# Patient Record
Sex: MALE | Race: WHITE
[De-identification: names, ages, dates, MRNs, and addresses within clinical notes are randomized per-mention and may not be internally consistent; named-entity substitution may affect disease eponyms.]

---
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
## 2019-02-17 ENCOUNTER — HOSPITAL ENCOUNTER (EMERGENCY)
Dept: HOSPITAL 62 - ER | Age: 19
LOS: 1 days | Discharge: HOME | End: 2019-02-18
Payer: COMMERCIAL

## 2019-02-17 DIAGNOSIS — R45.1: ICD-10-CM

## 2019-02-17 DIAGNOSIS — F10.920: ICD-10-CM
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
## 2019-02-17 DIAGNOSIS — F41.9: ICD-10-CM

## 2019-02-17 DIAGNOSIS — S51.811A: Primary | ICD-10-CM

## 2019-02-17 LAB
ADD MANUAL DIFF: NO
ALBUMIN SERPL-MCNC: 4.3 G/DL (ref 3.7–5.6)
ALP SERPL-CCNC: 49 U/L (ref 65–260)
ALT SERPL-CCNC: 19 U/L (ref 10–40)
ANION GAP SERPL CALC-SCNC: 10 MMOL/L (ref 5–19)
APAP SERPL-MCNC: < 10 UG/ML (ref 10–30)
APPEARANCE UR: CLEAR
APTT PPP: (no result) S
AST SERPL-CCNC: 18 U/L (ref 10–45)
BARBITURATES UR QL SCN: NEGATIVE
BASOPHILS # BLD AUTO: 0.1 10^3/UL (ref 0–0.2)
BASOPHILS NFR BLD AUTO: 0.7 % (ref 0–2)
BILIRUB DIRECT SERPL-MCNC: 0.1 MG/DL (ref 0–0.4)
BILIRUB SERPL-MCNC: 0.4 MG/DL (ref 0.2–1.3)
BILIRUB UR QL STRIP: NEGATIVE
BUN SERPL-MCNC: 8 MG/DL (ref 7–20)
CALCIUM: 9.4 MG/DL (ref 8.4–10.2)
CHLORIDE SERPL-SCNC: 107 MMOL/L (ref 98–107)
CO2 SERPL-SCNC: 27 MMOL/L (ref 22–30)
EOSINOPHIL # BLD AUTO: 0.7 10^3/UL (ref 0–0.6)
EOSINOPHIL NFR BLD AUTO: 8 % (ref 0–6)
ERYTHROCYTE [DISTWIDTH] IN BLOOD BY AUTOMATED COUNT: 13.8 % (ref 11.5–14)
ETHANOL SERPL-MCNC: 49 MG/DL
GLUCOSE SERPL-MCNC: 88 MG/DL (ref 75–110)
GLUCOSE UR STRIP-MCNC: NEGATIVE MG/DL
HCT VFR BLD CALC: 46.2 % (ref 37.9–51)
HGB BLD-MCNC: 16.2 G/DL (ref 13.5–17)
KETONES UR STRIP-MCNC: NEGATIVE MG/DL
LYMPHOCYTES # BLD AUTO: 1.6 10^3/UL (ref 0.5–4.7)
LYMPHOCYTES NFR BLD AUTO: 19.1 % (ref 13–45)
MCH RBC QN AUTO: 31.6 PG (ref 27–33.4)
MCHC RBC AUTO-ENTMCNC: 35.2 G/DL (ref 32–36)
MCV RBC AUTO: 90 FL (ref 80–97)
METHADONE UR QL SCN: NEGATIVE
MONOCYTES # BLD AUTO: 0.8 10^3/UL (ref 0.1–1.4)
MONOCYTES NFR BLD AUTO: 9 % (ref 3–13)
NEUTROPHILS # BLD AUTO: 5.4 10^3/UL (ref 1.7–8.2)
NEUTS SEG NFR BLD AUTO: 63.2 % (ref 42–78)
NITRITE UR QL STRIP: NEGATIVE
PCP UR QL SCN: NEGATIVE
PH UR STRIP: 7 [PH] (ref 5–9)
PLATELET # BLD: 198 10^3/UL (ref 150–450)
POTASSIUM SERPL-SCNC: 4.2 MMOL/L (ref 3.6–5)
PROT SERPL-MCNC: 6.5 G/DL (ref 6.3–8.2)
PROT UR STRIP-MCNC: NEGATIVE MG/DL
RBC # BLD AUTO: 5.14 10^6/UL (ref 4.35–5.55)
SALICYLATES SERPL-MCNC: < 1 MG/DL (ref 2–20)
SODIUM SERPL-SCNC: 143.7 MMOL/L (ref 137–145)
SP GR UR STRIP: 1.01
TOTAL CELLS COUNTED % (AUTO): 100 %
URINE AMPHETAMINES SCREEN: NEGATIVE
URINE BENZODIAZEPINES SCREEN: NEGATIVE
URINE COCAINE SCREEN: NEGATIVE
URINE MARIJUANA (THC) SCREEN: (no result)
UROBILINOGEN UR-MCNC: NEGATIVE MG/DL (ref ?–2)
WBC # BLD AUTO: 8.5 10^3/UL (ref 4–10.5)

## 2019-02-17 PROCEDURE — 96372 THER/PROPH/DIAG INJ SC/IM: CPT

## 2019-02-17 PROCEDURE — 80307 DRUG TEST PRSMV CHEM ANLYZR: CPT

## 2019-02-17 PROCEDURE — 90471 IMMUNIZATION ADMIN: CPT

## 2019-02-17 PROCEDURE — 90715 TDAP VACCINE 7 YRS/> IM: CPT

## 2019-02-17 PROCEDURE — 93005 ELECTROCARDIOGRAM TRACING: CPT

## 2019-02-17 PROCEDURE — 85025 COMPLETE CBC W/AUTO DIFF WBC: CPT

## 2019-02-17 PROCEDURE — 12002 RPR S/N/AX/GEN/TRNK2.6-7.5CM: CPT

## 2019-02-17 PROCEDURE — 80053 COMPREHEN METABOLIC PANEL: CPT

## 2019-02-17 PROCEDURE — 36415 COLL VENOUS BLD VENIPUNCTURE: CPT

## 2019-02-17 PROCEDURE — 0HQDXZZ REPAIR RIGHT LOWER ARM SKIN, EXTERNAL APPROACH: ICD-10-PCS | Performed by: EMERGENCY MEDICINE

## 2019-02-17 PROCEDURE — 99285 EMERGENCY DEPT VISIT HI MDM: CPT

## 2019-02-17 PROCEDURE — 93010 ELECTROCARDIOGRAM REPORT: CPT

## 2019-02-17 PROCEDURE — 81001 URINALYSIS AUTO W/SCOPE: CPT
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
## 2019-02-17 NOTE — ER DOCUMENT REPORT
Addendum entered and electronically signed by NENITA BROWN MD  02/18/19 16:33:










Discharge





- Discharge


Clinical Impression: 


 Suicide attempt





Laceration of right forearm


Qualifiers:


 Encounter type: initial encounter Qualified Code(s): S51.811A - Laceration 

without foreign body of right forearm, initial encounter





Alcohol intoxication


Qualifiers:


 Complication of substance-induced condition: uncomplicated Qualified Code(s): 

F10.920 - Alcohol use, unspecified with intoxication, uncomplicated





Condition: Stable


Disposition: HOME, SELF-CARE


Additional Instructions: 


You have been evaluated and assessed at [facility] Emergency Department by both the 

medical and behavioral health teams after presenting for   suicidal ideation and

self-injurious behavior (cutting) and are now deemed appropriate for discharge. 

While in the ED, you received an initial medical screening, lab work, EKG, 

medications, direct staff observation, clinical evaluation, physician 

assessment, and outpatient resources.  You were cleared from both services.  

Mobile crisis resources were provided to you for when these situations arise and

you are encouraged to utilize services as discussed.  Homeless resources were 

provided to you to include shelter, food [facility], [facility], DSS Medicaid and 

vocational rehab contact information.  It is felt that you would benefit and be 

better able to maintain your mental health with therapy and you were provided 

with a list of mental health providers.  





DEPRESSION:


     Your evaluation reveals that you have mental depression. While symptoms may

be vague, they often include disturbance of sleep, fatigue, loss of appetite, 

and general loss of interest in life.  While depression may be a side effect of 

drugs, or a reaction to a major change in your life, many cases have no known 

cause.


     If depression is acute, and related to a major loss in your life, you can 

expect it to clear completely with time.  If you have been depressed a long 

time, are prone to repeated bouts of depression or low mood, or have been 

thinking of suicide, get help.


     Depression can be treated with anti-depressant medication and counselling. 

Long-term depression will often take a few weeks to clear, even with appropriate

medication.  Follow-up care is important.








SUICIDAL IDEATION:


     Suicidal ideation is a common medical term for thoughts about suicide, 

which may be as detailed as a formulated plan, without the suicidal act itself. 

Although most people who undergo suicidal ideation do not commit suicide, some 

go on to make suicide attempts. The range of suicidal ideation varies greatly 

from fleeting to detailed planning, role playing, and unsuccessful attempts.





     While thoughts about suicide are common, most people do not carry out 

serious actions to commit suicide.  Based upon your evaluation and discussion 

with you, we do not believe you are currently at risk to act upon your thoughts 

of suicide.  You have agreed to return to the Emergency Department, at any time,

if you feel inclined to act upon your suicidal thoughts.








FOLLOW-UP CARE:


If you have been referred to a physician for follow-up care, call the 

physicians office for an appointment as you were instructed or within the next 

two days.  If you experience worsening or a significant change in your symptoms,

notify the physician immediately or return to the Emergency Department at any 

time for re-evaluation.





Referrals: 


[facility] [Provider Group] - Follow up as needed





Addendum entered and electronically signed by JONELLE HALL LPCA  02/18/19 

16:31: 








Discharge





- Discharge


Clinical Impression: 


 Suicide attempt





Laceration of right forearm


Qualifiers:


 Encounter type: initial encounter Qualified Code(s): S51.811A - Laceration 

without foreign body of right forearm, initial encounter





Alcohol intoxication


Qualifiers:


 Complication of substance-induced condition: uncomplicated Qualified Code(s): 

F10.920 - Alcohol use, unspecified with intoxication, uncomplicated





Condition: Stable


Disposition: HOME, SELF-CARE


Additional Instructions: 


You have been evaluated and assessed at [facility] Emergency Department by both the 

medical and behavioral health teams after presenting for   suicidal ideation and

self-injurious behavior (cutting) and are now deemed appropriate for discharge. 

While in the ED, you received an initial medical screening, lab work, EKG, 

medications, direct staff observation, clinical evaluation, physician 

assessment, and outpatient resources.  You were cleared from both services.  

Mobile crisis resources were provided to you for when these situations arise and

you are encouraged to utilize services as discussed.  Homeless resources were 

provided to you to include shelter, food [facility], [facility], DSS Medicaid and 

vocational rehab contact information.  It is felt that you would benefit and be 

better able to maintain your mental health with therapy and you were provided 

with a list of mental health providers.  





DEPRESSION:


     Your evaluation reveals that you have mental depression. While symptoms may

be vague, they often include disturbance of sleep, fatigue, loss of appetite, 

and general loss of interest in life.  While depression may be a side effect of 

drugs, or a reaction to a major change in your life, many cases have no known 

cause.


     If depression is acute, and related to a major loss in your life, you can 

expect it to clear completely with time.  If you have been depressed a long 

time, are prone to repeated bouts of depression or low mood, or have been th

inking of suicide, get help.


     Depression can be treated with anti-depressant medication and counselling. 

Long-term depression will often take a few weeks to clear, even with appropriate

medication.  Follow-up care is important.








SUICIDAL IDEATION:


     Suicidal ideation is a common medical term for thoughts about suicide, 

which may be as detailed as a formulated plan, without the suicidal act itself. 

Although most people who undergo suicidal ideation do not commit suicide, some 

go on to make suicide attempts. The range of suicidal ideation varies greatly 

from fleeting to detailed planning, role playing, and unsuccessful attempts.





     While thoughts about suicide are common, most people do not carry out 

serious actions to commit suicide.  Based upon your evaluation and discussion wi

th you, we do not believe you are currently at risk to act upon your thoughts of

suicide.  You have agreed to return to the Emergency Department, at any time, if

you feel inclined to act upon your suicidal thoughts.








FOLLOW-UP CARE:


If you have been referred to a physician for follow-up care, call the 

physicians office for an appointment as you were instructed or within the next 

two days.  If you experience worsening or a significant change in your symptoms,

notify the physician immediately or return to the Emergency Department at any 

time for re-evaluation.





Referrals: 


[facility] [Provider Group] - Follow up as needed





Original Note:








ED General





- General


Stated Complaint: SUICIDAL IDEATION


Time Seen by Provider: 02/17/19 15:01


TRAVEL OUTSIDE OF THE [place] IN LAST 30 DAYS: No





- HPI


Notes: 





Patient is a 18-year-old male that presents to the emergency department for 

chief complaint of self-inflicted wound.


Patient has a laceration to his right forearm that he states he inflicted on 

himself with a pocket knife.  He is refusing to tell me any more details.  He is

very anxious and agitated.  Patient is stating that if he does not talk to 

Mal[place] he will not talk to anybody.  Patient was brought in by police after an 

unknown female contacted them stating that he was sending her pictures of a 

self-inflicted wound.  Police are unsure if he texted her any specific suicidal 

plans.  Patient does have a history of psychiatric illness and IVC in the past. 

Police did see the text message between the patient and female which showed his 

arm laceration.  HPI is limited because of patient's unwillingness to 

communicate with myself and staff currently.





Past Medical History: PTSD, bipolar





Past Surgical History: Unknown





Social History: Unknown





Family History: Reviewed and noncontributory for presenting illness





Allergies: Reviewed, see documented allergy list.








REVIEW OF SYSTEMS:


Unable to obtain because of patient's unwillingness to communicate








PHYSICAL EXAMINATION:





Vital signs reviewed, nursing noted reviewed.





GENERAL: Well-appearing, well-nourished and in no acute distress.





HEAD: Atraumatic, normocephalic.





EYES: Eyes appear normal, extraocular movements intact, sclera anicteric, 

conjunctiva are normal.





ENT: nares patent, oropharynx clear without exudates.  Moist mucous membranes.





NECK: Normal range of motion, supple without lymphadenopathy





LUNGS: Breath sounds clear to auscultation bilaterally and equal.  No wheezes 

rales or rhonchi.





HEART: Regular rate and rhythm without murmurs





ABDOMEN: Soft, nontender, normoactive bowel sounds.  No rebound, guarding, or 

rigidity. No masses appreciated.





EXTREMITIES: Nontender, good range of motion, no pitting or edema. 





NEUROLOGICAL: No focal neurological deficits. Moves all extremities spont

aneously Motor and sensory grossly intact on exam.





PSYCH: Agitated, anxious, no eye contact





SKIN: Warm, Dry, normal turgor, linear 5.5 cm full-thickness right forearm 

laceration with no active bleeding





- Related Data


Allergies/Adverse Reactions: 


                                        





red dye Allergy (Verified 02/14/18 13:58)


   


methylphenidate [From Concerta] Adverse Reaction (Verified 02/14/18 13:58)


   











Past Medical History





- Social History


Smoking Status: Unknown if Ever Smoked


Family History: Arthritis, CAD, COPD, CVA, DM, Hyperlipidemia, Hypertension, 

Malignancy


Pulmonary Medical History: Reports: Hx Asthma


Renal/ Medical History: Denies: Hx Peritoneal Dialysis


Psychiatric Medical History: Reports: Hx Bipolar Disorder, Hx Post Traumatic 

Stress Disorder, Hx Schizophrenia - paranoia





- Immunizations


Immunizations up to date: Yes





Physical Exam





- Vital signs


Vitals: 


                                        











Temp Pulse Resp BP Pulse Ox


 


 98.4 F   72   14   119/56 L  99 


 


 02/17/19 15:15  02/17/19 15:15  02/17/19 15:15  02/17/19 15:15  02/17/19 15:15














Course





- Re-evaluation


Re-evalutation: 





02/17/19 15:25


Vitals reviewed.  Nursing notes reviewed.  Patient is agitated and not complying

with HPI or exam.  He has a deep laceration to his right forearm.  Bleeding is 

controlled at presentation.  Patient was ordered Geodon for his agitated state 

and aggression towards myself and staff.  Patient placed on IVC for self-

inflicted wound and concern for suicide attempt.


02/17/19 17:35


Patient reevaluated after Geodon and has been calm and cooperative.  There is 

now a girlfriend at bedside who states that she found a suicide note written by 

the patient.  His blood work is unremarkable.  He does have a slight elevation 

of his alcohol.  His right forearm laceration will be cleaned and sutured.  

Patient awaiting psych evaluation in the morning.





- Vital Signs


Vital signs: 


                                        











Temp Pulse Resp BP Pulse Ox


 


 98.3 F   62   14   112/62   99 


 


 02/17/19 18:23  02/17/19 18:23  02/17/19 15:15  02/17/19 18:23  02/17/19 18:23














- Laboratory


Result Diagrams: 


                                 02/17/19 15:29





                                 02/17/19 15:29


Laboratory results interpreted by me: 


                                        











  02/17/19 02/17/19





  15:29 15:29


 


Eosinophils %  8.0 H 


 


Absolute Eosinophils  0.7 H 


 


Alkaline Phosphatase   49 L


 


Salicylates   < 1.0 L


 


Acetaminophen   < 10 L














- EKG Interpretation by Me


Additional EKG results interpreted by me: 





02/17/19 15:25


Interpreted by myself


1519: Normal sinus rhythm, rate 71, normal axis, no ectopy, no STEMI, no 

significant change from 10/20/17





Procedures





- Laceration/Wound Repair


  ** Right Arm


Time completed: 19:38


Wound length (cm): 5.5


Wound's Depth, Shape: Linear, Other - full thickness.  No: Into muscle


Laceration pre-procedure: Sterile PPE donned


Anesthetic type: 1% Lidocaine w/epi


Volume Anesthetic (mLs): 5


Wound explored: Clean, No foreign body removed


Wound Debrided: Moderate


Wound Repaired With: Sutures


Suture Size/Type: 4:0, Nylon


Number of Sutures: 5


Layer Closure?: No


Post-procedure wound care: Sterile dressing applied


Post-procedure NV exam normal: Yes


Complications: No





Discharge





- Discharge


Clinical Impression: 


 Suicide attempt





Laceration of right forearm


Qualifiers:


 Encounter type: initial encounter Qualified Code(s): S51.811A - Laceration 

without foreign body of right forearm, initial encounter





Alcohol intoxication


Qualifiers:


 Complication of substance-induced condition: uncomplicated Qualified Code(s): 

F10.920 - Alcohol use, unspecified with intoxication, uncomplicated





Condition: Stable

## 2019-02-18 VITALS — SYSTOLIC BLOOD PRESSURE: 118 MMHG | DIASTOLIC BLOOD PRESSURE: 61 MMHG
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
## 2019-03-25 ENCOUNTER — HOSPITAL ENCOUNTER (EMERGENCY)
Dept: HOSPITAL 62 - ER | Age: 19
LOS: 1 days | Discharge: HOME | End: 2019-03-26
Payer: MEDICAID

## 2019-03-25 DIAGNOSIS — R45.1: Primary | ICD-10-CM

## 2019-03-25 PROCEDURE — 93005 ELECTROCARDIOGRAM TRACING: CPT

## 2019-03-25 PROCEDURE — 99285 EMERGENCY DEPT VISIT HI MDM: CPT

## 2019-03-25 PROCEDURE — 93010 ELECTROCARDIOGRAM REPORT: CPT

## 2019-03-25 NOTE — EKG REPORT
SEVERITY:- BORDERLINE ECG -

SINUS ARRHYTHMIA, RATE [id number]

BORDERLINE PROLONGED QT INTERVAL

:

Confirmed by: Veena Gupta MD 25-Mar-2019 21:27:23

## 2019-03-25 NOTE — ER DOCUMENT REPORT
ED General





- General


Stated Complaint: PSYCH/IVC


Time Seen by Provider: 03/25/19 22:06


Notes: 





Patient is a 19-year-old male who was sent on vomiting, paperwork because of 

concerns for possible suicidal ideations.  Patient says that he can augment his 

girlfriend.  He became upset.  He says he was not at all suicidal.  He says his 

roommate was drunk and started pestering him and calling his girlfriend a 

"bitch".  Patient said he then became very upset and started yelling at his 

roommate and asked him not to call her bridge.  He then got a further argument 

with his roommate and then left the house.  Patient says he has a job tomorrow 

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  He said he want to get his drill 

get his stuff so that he had it in case he had to leave the house in this way 

still had his equipment for when he went to work tomorrow.  He also had a 

utility knife at that time.  Patient says that his roommate saw the knife and 

said that he was worried that he could be suicidal.  Patient is not suicidal.  

Patient said he therefore became upset and threw the knife outside.  Patient 

admits that he then yelled at his roommate "you would drive some crazy to the 

point that they would be suicidal".  Patient says he did not mean in any way 

with this, that he himself was suicidal.  Patient did not do anything to hurt 

himself.  The roommate then called mobile crisis and mobile crisis came and 

evaluated the patient.  Patient was eventually placed on IVC papers and brought 

to the ER.  Patient currently denies any suicidal thoughts.  He did try 2 weeks 

ago after an argument with his girlfriend and was drunk and upset.  He says it 

was a stupid thing that he did and he was drunk at that time and said that he 

was not truly suicidal.  Patient says that he is not at all suicidal now and 

says that he really wants to go home by morning time because he wants to meet up

with his new boss at 5 AM so he can start his new job.  He says if he misses 

meeting his boss and he will probably lose this job which he really needs.


TRAVEL OUTSIDE OF THE [place] IN LAST 30 DAYS: No





- Related Data


Allergies/Adverse Reactions: 


                                        





red dye Allergy (Verified 02/14/18 13:58)


   


methylphenidate [From Concerta] Adverse Reaction (Verified 02/14/18 13:58)


   











Past Medical History





- Social History


Smoking Status: Unknown if Ever Smoked


Frequency of alcohol use: Occasional


Drug Abuse: None


Family History: Arthritis, CAD, COPD, CVA, DM, Hyperlipidemia, Hypertension, 

Malignancy


Pulmonary Medical History: Reports: Hx Asthma


Renal/ Medical History: Denies: Hx Peritoneal Dialysis


Psychiatric Medical History: Reports: Hx Bipolar Disorder, Hx Post Traumatic 

Stress Disorder, Hx Schizophrenia - paranoia





- Immunizations


Immunizations up to date: Yes





Review of Systems





- Review of Systems


Notes: 





My Normal Review Basic





REVIEW OF SYSTEMS:


CONSTITUTIONAL :  Denies fever,  chills, or sweats.  Denies recent illness.


EENT:   Denies eye, ear, throat, or mouth pain or symptoms.  Denies nasal or 

sinus congestion.


RESPIRATORY:  Denies cough, cold, or chest congestion.  Denies shortness of 

breath, difficulty breathing, or wheezing.


GASTROINTESTINAL:  Denies abdominal pain.  Denies nausea, vomiting, or diarrhea.




MUSCULOSKELETAL:  Denies neck or back pain or joint pain or swelling.


SKIN:   Denies rash or skin lesions.


NEUROLOGICAL:  Denies altered mental status or loss of consciousness. 


PSYCHIATRIC: Admits to becoming agitated tonight.  He denies being suicidal or 

wanting to hurt himself.


ALL OTHER SYSTEMS REVIEWED AND NEGATIVE.





Physical Exam





- Vital signs


Vitals: 


                                        











Temp Pulse Resp BP Pulse Ox


 


 98.7 F   74   16   119/63   100 


 


 03/26/19 00:19  03/26/19 00:19  03/26/19 00:19  03/26/19 00:19  03/26/19 00:19














- Notes


Notes: 





General Appearance: Well nourished, alert, cooperative, no acute distress, no 

obvious discomfort.  Well-appearing.


Vitals: reviewed, See vital signs table.


Eyes: PERRL, EOMI, Conjuctiva clear


Mouth: No decreasd moisture


Lungs: No wheezing, No rales, No rhonci, No accessory muscle use, good air 

exchange bilaterally.


Heart: Normal rate, Regular rythm, No murmur, no rub


Abdomen: Normal BS, soft, No rigidity, No abdominal tenderness, No guarding, no 

rebound, no abdominal masses, no organomegaly


Extremities: s good pulses in all extremities, no swelling or tenderness in the 

extremities, no edema.


Skin: warm, dry, appropriate color, no rash


Neuro: speech clear, oriented x 3, normal affect, responds appropriately to 

questions.


Psychiatric: Patient has good linear thought process.  Demonstrates good 

organized thoughts.  When I initially enter the room he is easily agitated as he

is concerned he will lose his job but as I talked to him he became very calm was

able to handle a normal conversation with me.  He is not tearful or sad a

ppearing.  He maintains good eye contact.





Course





- Re-evaluation


Re-evalutation: 





03/26/19 04:03


Since patient has been here he has been upfront with me.  He does admit to 

previously cutting himself several weeks ago.  He says he was drunk at that 

time.  Says he did not really want hurt himself.  He did discuss the fact that 

he got in an argument with his girlfriend on the phone and then his roommate who

is drunk  kept badgering him about it to the point where he became upset and 

they felt he was suicidal.  Patient says the reason why he had a  in 

his hand was because he has to go to work tomorrow and he was cutting open the 

box to the drill he just bought for work.  Patient does admit that he said to 

his friend "you would drive someone to the point that they would want to kill 

himself".  Patient says he said this because his roommate was very drunk and 

kept bothering him.  He is said he did not mean this in a way that he would 

actually hurt himself.  The entire time the patient has been in the ER he has 

been very worried about making it to work on time in the morning.  I did keep 

him here for several hours and reassessed him and talked to him again.  Patient 

is not tearful.  He does not appear suicidal at all.  He is very appropriate 

when I talked to him.  He again is requesting that he leaves here so he can get 

to work by 5 AM.  Is currently 4 AM.  I will discharge him as I do not feel he 

is suicidal.  He seems focused on getting to work on time as he says this is a 

new job and he really needs it and this would be his first day at work.  I did 

call and speak with Rosa Elena Barnett who is the mobile crisis worker who evaluated 

the patient.  She said initially she was going to let him come voluntarily 

however the reason why she changed him to IVC was because she found out later 

that the patient had cut himself in the past.  She said initially the patient 

said that the cut his arm was not a big deal and was not resulted in mental 

health.  The patient says he said this because he did not feel as a result of 

mental health and was more because he had become upset after becoming drunk and 

therefore had made the mistake of cutting himself.  Patient did not cut himself 

today.  Did not do anything to hurt himself today.  He continues to deny being 

suicidal and does not have a plan.  Being that I have watched the patient now 

for several hours and that he has remained stable and continues to deny wanting 

to hurt himself and is adamant about trying to work I feel that it is unlikely 

that he will hurt himself.  I informed patient that he must return to the ER 

immediately if he ever has any thoughts of suicide or wanting to hurt himself.  

Patient agrees with plan and will be discharged home.





Dictation of this chart was performed using voice recognition software; 

therefore, there may be some unintended grammatical errors.


03/26/19 07:08








- Vital Signs


Vital signs: 


                                        











Temp Pulse Resp BP Pulse Ox


 


 98.2 F   88   20   102/70   97 


 


 03/26/19 04:30  03/26/19 04:30  03/26/19 04:30  03/26/19 04:30  03/26/19 04:30














- EKG Interpretation by Me


Additional EKG results interpreted by me: 





03/25/19 22:06


EKG is reviewed and interpreted by me.  EKG shows sinus rhythm with a rate of 84

bpm.  No ST segment elevation or depression.  No ischemic T wave inversions.  SC

interval, QRS duration, QT intervals are within normal range.  No old EKG 

available for comparison.





Discharge





- Discharge


Clinical Impression: 


 Agitation





Condition: Good


Disposition: HOME, SELF-CARE


Additional Instructions: 


Please return to the ER immediately if you develop any thoughts of suicide or if

you feel that you are becoming depressed. 


Forms:  Return to Work

## 2019-03-26 VITALS — DIASTOLIC BLOOD PRESSURE: 70 MMHG | SYSTOLIC BLOOD PRESSURE: 102 MMHG
